# Patient Record
(demographics unavailable — no encounter records)

---

## 2025-07-01 NOTE — PHYSICAL EXAM
[Chaperoned Physical Exam] : A chaperone was present in the examining room during all aspects of the physical examination. [MA] : MA [FreeTextEntry2] : Isha [Appropriately responsive] : appropriately responsive [Alert] : alert [No Acute Distress] : no acute distress [No Lymphadenopathy] : no lymphadenopathy [Regular Rate Rhythm] : regular rate rhythm [No Murmurs] : no murmurs [Clear to Auscultation B/L] : clear to auscultation bilaterally [Soft] : soft [Non-tender] : non-tender [Non-distended] : non-distended [Oriented x3] : oriented x3 [Examination Of The Breasts] : a normal appearance [No Discharge] : no discharge [No Masses] : no breast masses were palpable [No Lesions] : no lesions  [Labia Majora] : normal [Labia Minora] : normal [Normal] : normal [Uterine Adnexae] : normal [FreeTextEntry4] : small amount of discharge  [FreeTextEntry9] : deferred [FreeTextEntry8] : no CMT normal sized uterus NT adnexa

## 2025-07-01 NOTE — DISCUSSION/SUMMARY
[FreeTextEntry1] : 1. Nutrition/Activity: The benefits of adequate calcium intake and a daily multivitamin along with routine daily cardiovascular exercise were reviewed with the patient. 2. Health Screening:  -cervical cancer screening reviewed as per [facility]CCP guidelines - her recent pap smear results were reviewed with her, and due to +HPV a pap smear was done today  Breast cancer screening - due to very dense breasts recommendation for MRI and mammo/sono. We reviewed schedule of timing of these tests  3. Sex Health: The importance of safe-sex practices was discussed with the patient. STD screening was offered to patient, she agreed.  4. Contraception: does not desire contraception 5. Discharge - vaginitis culture sent

## 2025-07-01 NOTE — HISTORY OF PRESENT ILLNESS
[N] : Patient reports normal menses [Y] : Positive pregnancy history [Menarche Age: ____] : age at menarche was [unfilled] [No] : Patient does not have concerns regarding sex [Mammogramdate] : 12/17/24 [TextBox_19] : BR2 [BreastSonogramDate] : 12/17/24 [TextBox_25] : BR2 [PapSmeardate] : 05/31/24 [TextBox_31] : NEG [HPVDate] : 05/31/24 [TextBox_78] : POS [LMPDate] : 07/01/25 [PGHxTotal] : 2 [facility] [[facility] 2 [FreeTextEntry1] : 07/01/25

## 2025-07-01 NOTE — END OF VISIT
[Time Spent: ___ minutes] : I have spent [unfilled] minutes of time on the encounter which excludes teaching and separately reported services. decreased ROM/decreased strength

## 2025-07-01 NOTE — COUNSELING
[Nutrition/ Exercise/ Weight Management] : nutrition, exercise, weight management [Vitamins/Supplements] : vitamins/supplements [Sunscreen] : sunscreen [Breast Self Exam] : breast self exam [STD (testing, results, tx)] : STD (testing, results, tx)